# Patient Record
Sex: FEMALE
[De-identification: names, ages, dates, MRNs, and addresses within clinical notes are randomized per-mention and may not be internally consistent; named-entity substitution may affect disease eponyms.]

---

## 2022-01-05 ENCOUNTER — HOSPITAL ENCOUNTER (INPATIENT)
Dept: HOSPITAL 75 - LDRP | Age: 27
LOS: 2 days | Discharge: HOME | End: 2022-01-07
Attending: OBSTETRICS & GYNECOLOGY | Admitting: OBSTETRICS & GYNECOLOGY
Payer: COMMERCIAL

## 2022-01-05 VITALS — SYSTOLIC BLOOD PRESSURE: 110 MMHG | DIASTOLIC BLOOD PRESSURE: 72 MMHG

## 2022-01-05 VITALS — SYSTOLIC BLOOD PRESSURE: 104 MMHG | DIASTOLIC BLOOD PRESSURE: 62 MMHG

## 2022-01-05 VITALS — BODY MASS INDEX: 33.98 KG/M2 | WEIGHT: 191.8 LBS | HEIGHT: 62.99 IN

## 2022-01-05 VITALS — DIASTOLIC BLOOD PRESSURE: 79 MMHG | SYSTOLIC BLOOD PRESSURE: 115 MMHG

## 2022-01-05 VITALS — SYSTOLIC BLOOD PRESSURE: 103 MMHG | DIASTOLIC BLOOD PRESSURE: 69 MMHG

## 2022-01-05 VITALS — DIASTOLIC BLOOD PRESSURE: 72 MMHG | SYSTOLIC BLOOD PRESSURE: 115 MMHG

## 2022-01-05 VITALS — SYSTOLIC BLOOD PRESSURE: 104 MMHG | DIASTOLIC BLOOD PRESSURE: 60 MMHG

## 2022-01-05 VITALS — SYSTOLIC BLOOD PRESSURE: 115 MMHG | DIASTOLIC BLOOD PRESSURE: 65 MMHG

## 2022-01-05 VITALS — SYSTOLIC BLOOD PRESSURE: 113 MMHG | DIASTOLIC BLOOD PRESSURE: 74 MMHG

## 2022-01-05 VITALS — DIASTOLIC BLOOD PRESSURE: 62 MMHG | SYSTOLIC BLOOD PRESSURE: 94 MMHG

## 2022-01-05 VITALS — SYSTOLIC BLOOD PRESSURE: 116 MMHG | DIASTOLIC BLOOD PRESSURE: 79 MMHG

## 2022-01-05 DIAGNOSIS — O34.211: Primary | ICD-10-CM

## 2022-01-05 DIAGNOSIS — D62: ICD-10-CM

## 2022-01-05 DIAGNOSIS — Z3A.39: ICD-10-CM

## 2022-01-05 LAB
BASOPHILS # BLD AUTO: 0 10^3/UL (ref 0–0.1)
BASOPHILS NFR BLD AUTO: 0 % (ref 0–10)
EOSINOPHIL # BLD AUTO: 0.2 10^3/UL (ref 0–0.3)
EOSINOPHIL NFR BLD AUTO: 3 % (ref 0–10)
HCT VFR BLD CALC: 30 % (ref 35–52)
HGB BLD-MCNC: 9.6 G/DL (ref 11.5–16)
LYMPHOCYTES # BLD AUTO: 1.9 10^3/UL (ref 1–4)
LYMPHOCYTES NFR BLD AUTO: 27 % (ref 12–44)
MANUAL DIFFERENTIAL PERFORMED BLD QL: NO
MCH RBC QN AUTO: 26 PG (ref 25–34)
MCHC RBC AUTO-ENTMCNC: 32 G/DL (ref 32–36)
MCV RBC AUTO: 82 FL (ref 80–99)
MONOCYTES # BLD AUTO: 0.5 10^3/UL (ref 0–1)
MONOCYTES NFR BLD AUTO: 7 % (ref 0–12)
NEUTROPHILS # BLD AUTO: 4.6 10^3/UL (ref 1.8–7.8)
NEUTROPHILS NFR BLD AUTO: 63 % (ref 42–75)
PLATELET # BLD: 295 10^3/UL (ref 130–400)
PMV BLD AUTO: 10.8 FL (ref 9–12.2)
WBC # BLD AUTO: 7.3 10^3/UL (ref 4.3–11)

## 2022-01-05 PROCEDURE — 86850 RBC ANTIBODY SCREEN: CPT

## 2022-01-05 PROCEDURE — 94664 DEMO&/EVAL PT USE INHALER: CPT

## 2022-01-05 PROCEDURE — 86900 BLOOD TYPING SEROLOGIC ABO: CPT

## 2022-01-05 PROCEDURE — 86901 BLOOD TYPING SEROLOGIC RH(D): CPT

## 2022-01-05 PROCEDURE — 36415 COLL VENOUS BLD VENIPUNCTURE: CPT

## 2022-01-05 PROCEDURE — 85025 COMPLETE CBC W/AUTO DIFF WBC: CPT

## 2022-01-05 RX ADMIN — Medication SCH ML: at 21:16

## 2022-01-05 RX ADMIN — DOCUSATE SODIUM SCH MG: 100 CAPSULE ORAL at 20:55

## 2022-01-05 RX ADMIN — HYDROCODONE BITARTRATE AND ACETAMINOPHEN PRN EA: 5; 325 TABLET ORAL at 11:56

## 2022-01-05 RX ADMIN — DOCUSATE SODIUM SCH MG: 100 CAPSULE ORAL at 21:16

## 2022-01-05 RX ADMIN — HYDROCODONE BITARTRATE AND ACETAMINOPHEN PRN EA: 5; 325 TABLET ORAL at 18:16

## 2022-01-05 RX ADMIN — KETOROLAC TROMETHAMINE SCH MG: 30 INJECTION, SOLUTION INTRAMUSCULAR; INTRAVENOUS at 13:53

## 2022-01-05 RX ADMIN — Medication SCH MLS/HR: at 13:46

## 2022-01-05 RX ADMIN — KETOROLAC TROMETHAMINE SCH MG: 30 INJECTION, SOLUTION INTRAMUSCULAR; INTRAVENOUS at 21:16

## 2022-01-05 RX ADMIN — Medication SCH ML: at 20:55

## 2022-01-05 RX ADMIN — Medication SCH MLS/HR: at 09:50

## 2022-01-05 NOTE — HISTORY & PHYSICAL-OB
OB - Chief Complaint & HPI


Date/Time


Date of Admission:


Date of Admission:  2022 at 05:55


Date seen by a Provider:  2022


Time Seen by a Provider:  07:05





Chief Complaint/History


OB-Reason for Admission/Chief:   Section


Hx :  3


Hx Para:  2


Expected Date of Delivery:  2022


Gestational Age in Weeks:  39


Gestational Age in Days:  0


Indication for :  desires repeat 


Admission Nurse Assessment Rev:  Yes


History of Labs


O pos


Antibody neg


RPR NR


HBsAg NR


HCsAg NR


HIV NR


GC neg





Allergies and Home Medications


Allergies


Coded Allergies:  


     No Known Drug Allergies (Unverified , 21)





Patient Home Medication List


Home Medication List Reviewed:  Yes


Prenatal Vit W-Ca,Fe,FA(<1 mg) (Prenatal Formula) 1 Each Tablet, 1 EACH PO 

DAILY, (Reported)


   Entered as Reported by: ELLI PRICE on 21 1323





OB - History


Hx of Present Pregnancy


Prenatal Care:  Yes


Ultrasounds:  Normal mid trimester US


Obstetrical Complications:  None


Medical Complications:  None





Patient Past Medical History





n/a





Immunizations


Influenza Vaccine Up-to-Date:  Yes; Up-to-Date


Hepatitis A:  Yes


Hepatitis B:  Yes





OB - Admission Exam


Physical Exam


Vitals:





Vital Signs








 22





 07:02


 


Temp 37.4


 


Pulse 78


 


Resp 18


 


Pulse Ox 98


 


O2 Delivery Room Air








HEENT:  NCAT


Heart:  Rhythm Normal


Lungs:  Clear


Abdomen:  Gravid


Extremities:  Normal


Reflexes:  Normal


Fetal Heart Rate:  130's


Accelerations:  Accelerations Present


Decelerations:  No Decelerations


Short Term Variability:  Present


Long Term Variability:  Average (6-25)


Contractions on Admission:  6-10 Minutes Apart


Intensity:  Mild


Labs





Laboratory Tests








Test


 22


06:28 Range/Units


 


 


White Blood Count


 7.3 


 4.3-11.0


10^3/uL


 


Red Blood Count


 3.68 L


 3.80-5.11


10^6/uL


 


Hemoglobin 9.6 L 11.5-16.0  g/dL


 


Hematocrit 30 L 35-52  %


 


Mean Corpuscular Volume 82  80-99  fL


 


Mean Corpuscular Hemoglobin 26  25-34  pg


 


Mean Corpuscular Hemoglobin


Concent 32 


 32-36  g/dL





 


Red Cell Distribution Width 15.3 H 10.0-14.5  %


 


Platelet Count


 295 


 130-400


10^3/uL


 


Mean Platelet Volume 10.8  9.0-12.2  fL


 


Immature Granulocyte % (Auto) 1   %


 


Neutrophils (%) (Auto) 63  42-75  %


 


Lymphocytes (%) (Auto) 27  12-44  %


 


Monocytes (%) (Auto) 7  0-12  %


 


Eosinophils (%) (Auto) 3  0-10  %


 


Basophils (%) (Auto) 0  0-10  %


 


Neutrophils # (Auto)


 4.6 


 1.8-7.8


10^3/uL


 


Lymphocytes # (Auto)


 1.9 


 1.0-4.0


10^3/uL


 


Monocytes # (Auto)


 0.5 


 0.0-1.0


10^3/uL


 


Eosinophils # (Auto)


 0.2 


 0.0-0.3


10^3/uL


 


Basophils # (Auto)


 0.0 


 0.0-0.1


10^3/uL


 


Immature Granulocyte # (Auto)


 0.0 


 0.0-0.1


10^3/uL











OB - Assessment/Plan/Diagnosis


Assessment


Assessment:   section


Admission Dx


25 yo  @ 39 weeks


Previous 


Admission Status:  Inpatient Order (span 2 midnights)


Reason for Inpatient Admission:  


Repeat 





Plan


Plan:   Section











JOHNY AGUDELO DO             2022 07:17

## 2022-01-05 NOTE — DISCHARGE INST-WOMEN'S SERVICE
Discharge Inst-Women's Serv


Depart Medication/Instructions


New, Converted or Re-Newed RX:  Transmitted to Pharmacy


Final Diagnosis


POD 2 RLTCS


Problems Reviewed?:  Yes





Consults/Follow Up


Additional Follow Up:  Yes


Orders/Referrals


Dr. Munoz in 7-10 days and Dr. Nieto/ [facility] in 6 weeks





Activity


Activity:  Activity as Tolerated


Driving Instructions:  No Driving for 1 Week


NO SMOKING:  NO SMOKING


Nothing Inside Vagina:  No Douching, No [place], No Tampons





Diet


Discharge Diet:  No Restrictions


Symptoms to Report to :  Bleeding Excessive, Pain Increased, Fever Over 101 

Degrees F, Vaginal Bleeding Increase, Questions/Concerns


For Any Problems or Questions:  Contact Your Physician





Skin/Wound Care


Infection Signs and Symptoms:  Increased Redness, Foul Odor of Wound, Increased 

Drainage, Skin Itchy or Has a Rash, Increased Swelling, Temperature Above 101  F


Operative Area Clean and Dry:  Keep Incision Clean/Dry


Stitches/Staples/Dermabond:  Dermabond, Care of Stitches


Bathing Instructions:  JOHNY Fuchs DO             Jan 5, 2022 07:24

## 2022-01-05 NOTE — OPERATIVE REPORT
DATE OF SERVICE:  



PREOPERATIVE DIAGNOSES:

1.  A 26-year-old G3, P2 at 39 weeks gestation.

2.  Previous  section.



POSTOPERATIVE DIAGNOSES:

1.  A 26-year-old G3, P2 at 39 weeks gestation.

2.  Previous  section.



PROCEDURE PERFORMED:

Repeat low transverse  section.



SURGEON:

Emmanuel Agudelo DO.



ANESTHESIA:

Spinal.



ESTIMATED BLOOD LOSS:

500 mL.



URINE OUTPUT:

50 mL clear at the end of the procedure.



FLUIDS:

1400 mL of lactated Ringer's solution.



FINDINGS:

A live male infant weighing 7 pounds 5 ounces, Apgars of 8 and 9.  Grossly

normal appearing uterus, bilateral fallopian tubes, and ovaries.



SPECIMEN SENT:

None.



INDICATIONS FOR PROCEDURE:

This 26-year-old female is a patient that sought prenatal care at [facility].  Her prenatal care was uncomplicated with the

exception for need for repeat  due to prior  x2.  Risks of the

procedure were discussed with the patient in detail on her preoperative

consultation.  After all of her questions were answered, consent was obtained in

the preoperative area and the patient was taken to the operating room.



OPERATIVE REPORT IN DETAIL:

Once in the operating room, spinal analgesia was found to be adequate.  She was

placed in supine position with leftward tilt, prepped and draped in a normal

sterile fashion.  Timeout was performed and anesthesia was tested.  I then made

a Pfannenstiel skin incision through the previously existing scar using knife

and carried down to the underlying fascia using Bovie cautery.  The fascial

incision was extended laterally using Bovie cautery.  The superior aspect of the

fascial incision was then grasped with Kocher clamps, tented up, and dissected

off the underlying rectus muscles.  The inferior aspect of fascial incision was

then grasped with Kocher clamps, tented up, and dissected off the underlying

rectus muscles.  The rectus muscle was dissected down the midline using sharp

dissection, which exposed the peritoneum, which I entered bluntly using blunt

traction.  Zay ring retractor was placed within the peritoneal incision,

which offers excellent lateral sidewall retraction.  I identified the lower

uterine segment, which was found to be thinned out.  I made a low transverse

incision to the vesicouterine peritoneum and bluntly dissected off the lower

uterine segment, creating a bladder flap.  I then proceeded with my myotomy

until membranes were visualized, at which point, I extended the uterine incision

laterally and superiorly using bandage scissors.  Amniotomy was then performed

using an Allis clamp.  Clear fluid was noted.  With gentle fundal pressure, the

infant's head was elevated up out of the incision, where it was delivered

through the incision.  The nares and oropharynx were bulb suctioned.  Anterior

and posterior shoulders were delivered.  The infant was then brought to the

operative field, where the cord was doubly clamped and cut and infant was handed

off to awaiting nurses in attendance.  Cord blood was collected, 3-vessel cord

with intact placenta was delivered spontaneously thereafter.  IV Pitocin was

initiated to facilitate uterine contraction.  Uterine fundus became firmer with

bimanual massage.  The uterus was then exteriorized and cleared of all

endometrial clots and debris.  I then proceeded with closing the uterine

incision using 0 Vicryl suture in a running locked fashion.  Second layer of

imbricating 0 Monocryl was placed.  Excellent hemostasis was noted after doing

this.  I then placed the uterus back in the pelvis and copiously irrigated the

pelvis using normal saline.  Once again, there was no active bleeding noted from

any of my dissection planes.  I placed Interceed antiadhesive over my low

transverse incision.  I then removed the Zay ring retractor and proceeded

with closing the peritoneum using 2-0 Vicryl suture in a running fashion.  The

rectus muscle was reapproximated using 3-0 Vicryl suture in an interrupted

fashion.  The fascia was reapproximated using 0 Vicryl suture in a running

fashion.  The subcutaneous tissue was reapproximated using 3-0 plain interrupted

subcutaneous stitch and skin was reapproximated using 4-0 Monocryl running

subcuticular.  Dermabond was applied to the incision and sterile dressing with

adhesive white tape.  The patient tolerated the procedure well and was taken to

recovery area in a stable condition.  Lap and sponge counts were correct at the

end of the procedure.  Instrument count was correct as well.  Two grams of Ancef

were given preoperatively for infection prophylaxis.





Job ID: [id number]

DocumentID: [id number]

Dictated Date:  2022 08:49:20

Transcription Date: 2022 13:04:13

Dictated By: EMMANUEL AGUDELO DO

## 2022-01-06 VITALS — DIASTOLIC BLOOD PRESSURE: 54 MMHG | SYSTOLIC BLOOD PRESSURE: 113 MMHG

## 2022-01-06 VITALS — SYSTOLIC BLOOD PRESSURE: 118 MMHG | DIASTOLIC BLOOD PRESSURE: 66 MMHG

## 2022-01-06 VITALS — SYSTOLIC BLOOD PRESSURE: 109 MMHG | DIASTOLIC BLOOD PRESSURE: 63 MMHG

## 2022-01-06 VITALS — SYSTOLIC BLOOD PRESSURE: 107 MMHG | DIASTOLIC BLOOD PRESSURE: 68 MMHG

## 2022-01-06 VITALS — DIASTOLIC BLOOD PRESSURE: 67 MMHG | SYSTOLIC BLOOD PRESSURE: 112 MMHG

## 2022-01-06 LAB
BASOPHILS # BLD AUTO: 0 10^3/UL (ref 0–0.1)
BASOPHILS NFR BLD AUTO: 1 % (ref 0–10)
EOSINOPHIL # BLD AUTO: 0.3 10^3/UL (ref 0–0.3)
EOSINOPHIL NFR BLD AUTO: 4 % (ref 0–10)
HCT VFR BLD CALC: 27 % (ref 35–52)
HGB BLD-MCNC: 8.7 G/DL (ref 11.5–16)
LYMPHOCYTES # BLD AUTO: 2.1 10^3/UL (ref 1–4)
LYMPHOCYTES NFR BLD AUTO: 24 % (ref 12–44)
MANUAL DIFFERENTIAL PERFORMED BLD QL: NO
MCH RBC QN AUTO: 26 PG (ref 25–34)
MCHC RBC AUTO-ENTMCNC: 32 G/DL (ref 32–36)
MCV RBC AUTO: 82 FL (ref 80–99)
MONOCYTES # BLD AUTO: 0.6 10^3/UL (ref 0–1)
MONOCYTES NFR BLD AUTO: 6 % (ref 0–12)
NEUTROPHILS # BLD AUTO: 5.7 10^3/UL (ref 1.8–7.8)
NEUTROPHILS NFR BLD AUTO: 65 % (ref 42–75)
PLATELET # BLD: 273 10^3/UL (ref 130–400)
PMV BLD AUTO: 10.7 FL (ref 9–12.2)
WBC # BLD AUTO: 8.8 10^3/UL (ref 4.3–11)

## 2022-01-06 RX ADMIN — HYDROCODONE BITARTRATE AND ACETAMINOPHEN PRN EA: 5; 325 TABLET ORAL at 19:49

## 2022-01-06 RX ADMIN — HYDROCODONE BITARTRATE AND ACETAMINOPHEN PRN EA: 5; 325 TABLET ORAL at 13:08

## 2022-01-06 RX ADMIN — DOCUSATE SODIUM SCH MG: 100 CAPSULE ORAL at 19:48

## 2022-01-06 RX ADMIN — IBUPROFEN SCH MG: 600 TABLET ORAL at 19:49

## 2022-01-06 RX ADMIN — Medication SCH ML: at 04:00

## 2022-01-06 RX ADMIN — DOCUSATE SODIUM SCH MG: 100 CAPSULE ORAL at 08:18

## 2022-01-06 RX ADMIN — KETOROLAC TROMETHAMINE SCH MG: 30 INJECTION, SOLUTION INTRAMUSCULAR; INTRAVENOUS at 04:00

## 2022-01-06 RX ADMIN — KETOROLAC TROMETHAMINE SCH MG: 30 INJECTION, SOLUTION INTRAMUSCULAR; INTRAVENOUS at 07:16

## 2022-01-06 RX ADMIN — HYDROCODONE BITARTRATE AND ACETAMINOPHEN PRN EA: 5; 325 TABLET ORAL at 00:05

## 2022-01-06 RX ADMIN — IBUPROFEN SCH MG: 600 TABLET ORAL at 13:07

## 2022-01-06 NOTE — ANESTHESIA-REGIONAL POST-OP
Regional


Patient Condition


Mental Status:  Alert, Oriented x3


Circulation:  Same as Pre-Op


Headache:  Absent


Sensation:  Full Recovery


Motor Block:  Absent





Post Op Complications


Complications


None





Follow Up Care/Instructions


Patient Instructions


None needed.





Anesthesia/Patient Condition


Patient is doing well, no complaints, stable vital signs, no apparent adverse 

anesthesia problems.   


No complications reported per nursing.











PETRONA DODSON CRNA             Jan 6, 2022 09:02

## 2022-01-06 NOTE — POSTPARTUM PROGRESS NOTE
Postpartum Note


Postpartum Note


Postpartum Day # 1





Subjective:


Patient is without complaints. Ambulating, voiding. Tolerating a regular diet 

without nausea or vomiting. Normal lochia. Pain is well controlled with oral 

pain medications.





Objective:








Physical Exam:


General - Alert and oriented, no apparent distress


Abdomen - Soft, appropriately tender to palpation, non-distended, fundus firm at

umbilicus; incision c/d/i


Extremities - no edema, negative Joann's bilaterally 





Assessment:


Post-partum day # 1, status post RLTCS.


Recovering well, hemodynamically stable


Acute blood loss anemia





Plan:


Routine postpartum care.


Encourage breast feeding.


Encourage ambulation.


Ferrous sulfate supplementation.


Plan for discharge tomorrow





Vitals - Labs


Vital Signs - I&O





Vital Signs








  Date Time  Temp Pulse Resp B/P (MAP) Pulse Ox O2 Delivery O2 Flow Rate FiO2


 


1/6/22 08:19 36.6 79 16 107/68 (81) 100 Room Air  


 


1/6/22 04:00 36.0 70 16 113/54 (73) 98 Room Air  


 


1/6/22 00:05 36.1 78 18 109/63 (78) 98 Room Air  


 


1/5/22 20:00 36.0 72 18 103/69 (80) 99 Room Air  


 


1/5/22 16:00 36.4 71 18 115/65 (82) 100 Room Air  


 


1/5/22 15:00      Room Air  


 


1/5/22 13:00 36.5 62 16 115/72 (86) 98 Room Air  














I & O 


 


 1/6/22





 07:00


 


Intake Total 4040 ml


 


Output Total 1050 ml


 


Balance 2990 ml











Labs


Laboratory Tests


1/6/22 05:49: 


White Blood Count 8.8, Red Blood Count 3.33L, Hemoglobin 8.7L, Hematocrit 27L, 

Mean Corpuscular Volume 82, Mean Corpuscular Hemoglobin 26, Mean Corpuscular 

Hemoglobin Concent 32, Red Cell Distribution Width 15.5H, Platelet Count 273, 

Mean Platelet Volume 10.7, Immature Granulocyte % (Auto) 1, Neutrophils (%) 

(Auto) 65, Lymphocytes (%) (Auto) 24, Monocytes (%) (Auto) 6, Eosinophils (%) 

(Auto) 4, Basophils (%) (Auto) 1, Neutrophils # (Auto) 5.7, Lymphocytes # (Auto)

2.1, Monocytes # (Auto) 0.6, Eosinophils # (Auto) 0.3, Basophils # (Auto) 0.0, 

Immature Granulocyte # (Auto) 0.1











WILIAN RAMSEY APRLISS           Jan 6, 2022 11:48

## 2022-01-07 VITALS — SYSTOLIC BLOOD PRESSURE: 112 MMHG | DIASTOLIC BLOOD PRESSURE: 57 MMHG

## 2022-01-07 VITALS — SYSTOLIC BLOOD PRESSURE: 114 MMHG | DIASTOLIC BLOOD PRESSURE: 59 MMHG

## 2022-01-07 RX ADMIN — HYDROCODONE BITARTRATE AND ACETAMINOPHEN PRN EA: 5; 325 TABLET ORAL at 09:26

## 2022-01-07 RX ADMIN — IBUPROFEN SCH MG: 600 TABLET ORAL at 09:26

## 2022-01-07 RX ADMIN — DOCUSATE SODIUM SCH MG: 100 CAPSULE ORAL at 09:26

## 2022-01-07 RX ADMIN — IBUPROFEN SCH MG: 600 TABLET ORAL at 02:20

## 2022-01-07 RX ADMIN — HYDROCODONE BITARTRATE AND ACETAMINOPHEN PRN EA: 5; 325 TABLET ORAL at 02:20

## 2022-01-07 NOTE — POSTPARTUM PROGRESS NOTE
Postpartum Note


Postpartum Note


Postpartum Day # 2





Subjective:


Patient is without complaints. Ambulating, voiding. Tolerating a regular diet 

without nausea or vomiting. Normal lochia. Pain is well controlled with oral 

pain medications.





Objective:








Physical Exam:


General - Alert and oriented, no apparent distress


Abdomen - Soft, appropriately tender to palpation, non-distended, fundus firm at

umbilicus; incision c/d/i


Extremities - no edema, negative Joann's bilaterally 





Assessment:


Post-partum day # 2, status post RLTCS.


Recovering well, hemodynamically stable


Acute blood loss anemia





Plan:


Routine postpartum care.


Encourage breast feeding.


Encourage ambulation.


Ferrous sulfate supplementation.


Plan for discharge today





Vitals - Labs


Vital Signs - I&O





Vital Signs








  Date Time  Temp Pulse Resp B/P (MAP) Pulse Ox O2 Delivery O2 Flow Rate FiO2


 


1/7/22 02:20 36.6 69 18 112/57 (75) 97 Room Air  


 


1/6/22 19:49 36.4 72 18 112/67 (82) 98 Room Air  


 


1/6/22 13:09 36.7 78 18 118/66 (83) 99 Room Air  














I & O 


 


 1/7/22





 07:00


 


Intake Total 800 ml


 


Balance 800 ml

















WILIAN RAMSEY APRN           Jan 7, 2022 08:40 no